# Patient Record
Sex: FEMALE | Race: WHITE | Employment: UNEMPLOYED | URBAN - METROPOLITAN AREA
[De-identification: names, ages, dates, MRNs, and addresses within clinical notes are randomized per-mention and may not be internally consistent; named-entity substitution may affect disease eponyms.]

---
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
## 2021-01-05 ENCOUNTER — OFFICE VISIT (OUTPATIENT)
Dept: PRIMARY CARE CLINIC | Age: 21
End: 2021-01-05

## 2021-01-05 DIAGNOSIS — Z20.822 ENCOUNTER FOR LABORATORY TESTING FOR COVID-19 VIRUS: Primary | ICD-10-CM

## 2021-01-05 NOTE — PROGRESS NOTES
Pt presents to the flu clinic today requesting a covid test. Pt denies symptoms but needs test in order to return to work/school. Pt declined to be seen by a doctor today.  RPG    Verbal consent received to perform test.

## 2021-01-07 LAB — SARS-COV-2, NAA: NOT DETECTED

## 2024-03-04 NOTE — LETTER
8/14/2017 8:31 AM 
 
RE:    Santiago Miller [birth year] 
[facility] [place] [id number] Thank you for referring Santiago Miller to our office. CC: Abdominal Pain 
  
History of present Illness Santiago Miller was seen today for  follow up of chronic abdominal pain an diarrhea. She reported limited pain and diarrhea on the FODMAP diet but some increase in pain and diarrhea since being off the diet at camps and on vacation. She denied  nausea or vomiting, oral reflux symptoms, heartburn, early satiety or dysphagia. The appetite has remained normal. The stools were described as being loose occurring up to 3 times a day often after eating without blood or perianal pain on passage. 
  
She reported normal urination and denied chronic fevers rashes or joint pain. She has had some weight loss which she attributed to some increase in activity and decrease in intake at [employer]. Visit Vitals  /78 (BP 1 Location: Right arm, BP Patient Position: Sitting)  Pulse 108  Temp 97.9 °F (36.6 °C) (Oral)  Resp 16  
 Ht 5' 9.88\" (1.775 m)  Wt 132 lb 3.2 oz (60 kg)  LMP 07/17/2017 (Within Days)  SpO2 100%  BMI 19.03 kg/m2 Current Outpatient Prescriptions Medication Sig Dispense Refill  
 HYDROcodone-acetaminophen (NORCO) 5-325 mg per tablet take 1 tablet by mouth every 4 hours to 6 hours if needed for pain  0  
 methylphenidate HCl (RITALIN) 5 mg tablet  predniSONE (DELTASONE) 20 mg tablet  VYVANSE 20 mg capsule Take 20 mg by mouth daily. Impression Santiago Miller is 13 y.o. with presumed functional abdominal pain and diarrhea which has responded to a FODMAP diet in the past. Most recently she has had a relapse in symptoms going off the diet while on vacation and at summer camps.  Her lab studies including an allergen panel, CBC, CMP, ESR, CRP, CMP, and stool for giardia and cryptosporidium,  following her initial visit returned normal, but father expressed some concern regarding her persistent symptoms. Her weight was down from 63 to 60 Kg and her BMI was 19 in the 29% with a Z score -0. 54. She attributed this to an increase in activity at camps with a decrease in intake. Plan/Recommendation Resume FODMAP  Diet Stool for calprotectin and h. Pylori antigen in view of weight loss and family history of h. pylori Return in 3 months pending Greater than 50% of 25 minute vist spent discussing the diet and concerns with weight loss Sincerely, Alyce Chapa MD 
 
 How Severe Are Your Spot(S)?: mild What Type Of Note Output Would You Prefer (Optional)?: Standard Output What Is The Reason For Today's Visit?: Full Body Skin Examination What Is The Reason For Today's Visit? (Being Monitored For X): concerning skin lesions on a periodic basis

## (undated) DEVICE — SET ADMIN 16ML TBNG L100IN 2 Y INJ SITE IV PIGGY BK DISP

## (undated) DEVICE — 1200 GUARD II KIT W/5MM TUBE W/O VAC TUBE: Brand: GUARDIAN

## (undated) DEVICE — SYRINGE MED 20ML STD CLR PLAS LUERLOCK TIP N CTRL DISP

## (undated) DEVICE — CONTAINER SPEC 20 ML LID NEUT BUFF FORMALIN 10 % POLYPR STS

## (undated) DEVICE — Z DISCONTINUED NO SUB IDED SET EXTN W/ 4 W STPCOCK M SPIN LOK 36IN

## (undated) DEVICE — KIT IV STRT W CHLORAPREP PD 1ML

## (undated) DEVICE — NEEDLE HYPO 18GA L1.5IN PNK S STL HUB POLYPR SHLD REG BVL

## (undated) DEVICE — BW-412T DISP COMBO CLEANING BRUSH: Brand: SINGLE USE COMBINATION CLEANING BRUSH

## (undated) DEVICE — ENDO CARRY-ON PROCEDURE KIT INCLUDES ENZYMATIC SPONGE, GAUZE, BIOHAZARD LABEL, TRAY, LUBRICANT, DIRTY SCOPE LABEL, WATER LABEL, TRAY, DRAWSTRING PAD, AND DEFENDO 4-PIECE KIT.: Brand: ENDO CARRY-ON PROCEDURE KIT

## (undated) DEVICE — KENDALL RADIOLUCENT FOAM MONITORING ELECTRODE -RECTANGULAR SHAPE: Brand: KENDALL

## (undated) DEVICE — SOLIDIFIER FLUID 3000 CC ABSORB

## (undated) DEVICE — BAG BELONG PT PERS CLEAR HANDL

## (undated) DEVICE — CANN NASAL O2 CAPNOGRAPHY AD -- FILTERLINE

## (undated) DEVICE — BAG SPEC BIOHZD LF 2MIL 6X10IN -- CONVERT TO ITEM 357326

## (undated) DEVICE — CATH IV AUTOGRD BC BLU 22GA 25 -- INSYTE

## (undated) DEVICE — FORCEPS BX L240CM JAW DIA2.8MM L CAP W/ NDL MIC MESH TOOTH